# Patient Record
(demographics unavailable — no encounter records)

---

## 2024-11-25 NOTE — DISCUSSION/SUMMARY
[Patient] : the patient [Risks] : risks [Benefits] : benefits [Alternatives] : alternatives [___ Month(s)] : in [unfilled] month(s) [FreeTextEntry1] : Blood work reviewed questions addressed with patient renew rosuvastatin.  Follow-up with PMD, extremely annually and as needed.. Pulmonary referral made.    [EKG obtained to assist in diagnosis and management of assessed problem(s)] : EKG obtained to assist in diagnosis and management of assessed problem(s)

## 2024-11-25 NOTE — CARDIOLOGY SUMMARY
[de-identified] : March 27, 2023 sinus rhythm 10/2/23 sinus wnl November 25, 2024 sinus rhythm [de-identified] : 2021 mild aortic sclerosis normal LV function [de-identified] : 2022 coronary calcium score of 3 [de-identified] : 2022 carotid vascular study normal

## 2024-11-25 NOTE — REASON FOR VISIT
[CV Risk Factors and Non-Cardiac Disease] : CV risk factors and non-cardiac disease [Arrhythmia/ECG Abnorrmalities] : arrhythmia/ECG abnormalities [Hyperlipidemia] : hyperlipidemia [Hypertension] : hypertension [FreeTextEntry3] : nelson [FreeTextEntry1] : Taking statin" daily  without problem.  Remains active physically.  No chest pain dyspnea palpitations or edema.   She does have intermittent shortness of breath which she attributes to post-COVID.  Had recent labs with PMD reviewed and discussed.

## 2024-11-25 NOTE — ASSESSMENT
[FreeTextEntry1] : 71-year-old woman with presence of coronary calcium score of 3 on most recent testing hyperlipidemia elevated blood pressure  Asymptomatic.  Of note she indicates brother also has hyperlipidemia  LDL satisfactory.   Intermittent cough and dyspnea may be post-COVID unlikely to be cardiac

## 2024-11-25 NOTE — PHYSICAL EXAM

## 2025-01-15 NOTE — HISTORY OF PRESENT ILLNESS
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

## 2025-01-15 NOTE — PHYSICAL EXAM
[No Acute Distress] : no acute distress [No Deformities] : no deformities [IV] : Mallampati Class: IV [Normal Appearance] : normal appearance [Supple] : supple [No Neck Mass] : no neck mass [No JVD] : no jvd [Normal Rate/Rhythm] : normal rate/rhythm [Normal S1, S2] : normal s1, s2 [No Murmurs] : no murmurs [No Resp Distress] : no resp distress [No Acc Muscle Use] : no acc muscle use [Benign] : benign [Not Tender] : not tender [No Masses] : no masses [Soft] : soft [No Hernias] : no hernias [Normal Gait] : normal gait [No Clubbing] : no clubbing [No Edema] : no edema [No Rash] : no rash [No Motor Deficits] : no motor deficits [Normal Affect] : normal affect [TextBox_2] : pleasant    female   no cough  [TextBox_11] : crowded no gita puga  [TextBox_68] : bilateral air entry wheezing

## 2025-01-15 NOTE — REVIEW OF SYSTEMS
[Nasal Congestion] : nasal congestion [Wheezing] : wheezing [SOB on Exertion] : sob on exertion [Fever] : no fever [Recent Wt Gain (___ Lbs)] : ~T no recent weight gain [Chills] : no chills [Recent Wt Loss (___ Lbs)] : ~T no recent weight loss [Sore Throat] : no sore throat [Postnasal Drip] : no postnasal drip [Sinus Problems] : no sinus problems [Cough] : cough [Hemoptysis] : no hemoptysis [Sputum] : no sputum [Dyspnea] : no dyspnea [Chest Discomfort] : no chest discomfort [Palpitations] : no palpitations [GERD] : no gerd [Abdominal Pain] : no abdominal pain [Nausea] : no nausea [Vomiting] : no vomiting [Dysphagia] : no dysphagia [Bleeding] : no bleeding [Chronic Pain] : no chronic pain [Rash] : no rash [Blood Transfusion] : no blood transfusion [Clotting Disorder/ Frequent bleeding] : no clotting disorder/ frequent bleeding [Diabetes] : no diabetes [Thyroid Problem] : no thyroid problem [Obesity] : no obesity

## 2025-01-15 NOTE — ASSESSMENT
[FreeTextEntry1] : 72y/o female  1- poorly controlled allergic asthma    trigger  dust   cat  horse      2- allergies  3- nasal congestion  4- HTN  5- vaccination per primary   Recommendations 1- cxr   performed   normal  2- PFT 3- blood work  4- albuterol neb x one in office 5- nebulizer  shown today use and   care 6- duonebs  ordered   q   8 hour prn  7- nebulizer ordered 9- prevention  discussed Hepa  air filters and   home care dust control  10-  ENT  11- trial of  flonase one spray bid 12- azelastine one spray bid    -discussion meds  reviewed neb  and  imaging    -agreement on plan  - sleep evaluation once stable

## 2025-02-04 NOTE — SIGNATURES
[TextEntry] : This note was authored by Shahzad Ramirez working as a scribe for Dr. Joan Falcon.   I, Dr. Joan Falcon, have reviewed the content of this note and confirm it is true and accurate. I personally performed the history and physical examination and made all the decisions. 02/04/2025

## 2025-02-04 NOTE — HISTORY OF PRESENT ILLNESS
[FreeTextEntry1] : 2025. KATHARINE BARNES 73-year-old female  LMP . She presents for an annual gyn exam.  She denies abdominal and pelvic pain. No abnormal vaginal bleeding, vaginal discharge, or vaginitis symptoms. She reports normal urination, no dysuria, no incontinence of urine. BM is normal per patient, no blood in stool or constipation/diarrhea.  No new medical conditions, medications, or surgeries since last visit.  PHQ: 1.  OBHx:  GynHx: vaginal atrophy, h/o ovarian cysts. No Hx of STI, fibroids, abnl Paps, or pelvic infections. PMH: osteopenia, asthma, arrhythmia, basal cell carcinoma SHx: D&C FHx: father w/ colon ca, MGM w/ uterine ca. Denies FHx of breast or ovarian cancer. Med: Albuterol, Advair All: NKDA [TextBox_4] : Pelvic sono 11/21: normal [Mammogramdate] : 07/24 [BoneDensityDate] : 12/23 [TextBox_37] : osteopenia, hip frax of 3 [ColonoscopyDate] : 12/24

## 2025-02-04 NOTE — PHYSICAL EXAM
[Chaperone Declined] : Patient declined chaperone [Appropriately responsive] : appropriately responsive [Alert] : alert [No Acute Distress] : no acute distress [No Lymphadenopathy] : no lymphadenopathy [Regular Rate Rhythm] : regular rate rhythm [No Murmurs] : no murmurs [Clear to Auscultation B/L] : clear to auscultation bilaterally [Soft] : soft [Non-tender] : non-tender [Non-distended] : non-distended [No HSM] : No HSM [No Lesions] : no lesions [No Mass] : no mass [Oriented x3] : oriented x3 [Examination Of The Breasts] : a normal appearance [No Masses] : no breast masses were palpable [Vulvar Atrophy] : vulvar atrophy [Labia Majora] : normal [Labia Minora] : normal [Atrophy] : atrophy [Normal] : normal [Uterine Adnexae] : normal [FreeTextEntry6] : no change in lipoma over R collarbone 2 x 2cm [FreeTextEntry9] : Guaiac negative, no masses

## 2025-02-04 NOTE — PLAN
[FreeTextEntry1] : Routine Gyn Exam: GynHx: vaginal atrophy, h/o ovarian cysts, PMH: osteopenia, SHx: D&C, FHx: father w/ colon ca, MGM w/ uterine ca BSE taught. Pap smear conducted. Rx given for mammogram 7/25 (last 7/24)  Advised pt. to schedule colonoscopy 12/29 (last 12/24) Pelvic sonogram performed 11/21: wnl  Vaginal Atrophy: Advise OTC lubricants, coconut oil  Osteopenia, hip frax of 3: Advised pt to f/u w/ endocrinologist Advised Vit D3 daily, calcium in diet, weight-bearing exercises  RTO in 1 year or PRN.

## 2025-02-05 NOTE — REVIEW OF SYSTEMS
[Nasal Congestion] : nasal congestion [Cough] : cough [Wheezing] : wheezing [SOB on Exertion] : sob on exertion [Fever] : no fever [Recent Wt Gain (___ Lbs)] : ~T no recent weight gain [Chills] : no chills [Recent Wt Loss (___ Lbs)] : ~T no recent weight loss [Sore Throat] : no sore throat [Postnasal Drip] : no postnasal drip [Sinus Problems] : no sinus problems [Hemoptysis] : no hemoptysis [Sputum] : no sputum [Dyspnea] : no dyspnea [Chest Discomfort] : no chest discomfort [Palpitations] : no palpitations [GERD] : no gerd [Abdominal Pain] : no abdominal pain [Nausea] : no nausea [Vomiting] : no vomiting [Dysphagia] : no dysphagia [Bleeding] : no bleeding [Chronic Pain] : no chronic pain [Rash] : no rash [Blood Transfusion] : no blood transfusion [Clotting Disorder/ Frequent bleeding] : no clotting disorder/ frequent bleeding [Diabetes] : no diabetes [Thyroid Problem] : no thyroid problem [Obesity] : no obesity [TextBox_30] : mild improved

## 2025-02-05 NOTE — HISTORY OF PRESENT ILLNESS
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   noted  allergic asthma -dx  in her 20's allergic asthma  - covid  4/2021   sinus first  urgent care   fever    no hospitalization  -  - allergies  cat   dog   horses  - no pneumonia hx  - last  steroids  given    9/2024  -on flovent      only still some sob - was followed by allergist     did try allergy shots in past - currently  still with wheezing and cough   on  flovent  -overall improved  -     nasal spray  with  good results  less congestion -   2/5/2025 72y/o female    allergic asthma  now   s/p  FLU  - did not get vaccination  - also feels  slapped  cheek virus    - cough  - took tamiflu   then   prendisone  finished -  [TextBox_11] : 1/1 [TextBox_13] : 10

## 2025-02-05 NOTE — ASSESSMENT
[FreeTextEntry1] : 74y/o female  1-  recent FLU    with asthmatic bronchitis   improved allergic asthma     trigger  dust   cat  horse      2-   UACS 3- HTN  54 vaccination    FLu   +       Recommendations 1-    to get RSV virus  2- PFT 3-   s/p  Tamiflu     s/p prednisone  4-   advair bid rinse   after use     generic  5-azelastine    bid 6- DuoNeb's  ordered   q   8 hour prn  7- flonase bid   - return after PFT discussion meds    reviewed blood work

## 2025-02-05 NOTE — PHYSICAL EXAM
[III] : Mallampati Class: III [Normal Appearance] : normal appearance [Supple] : supple [No Neck Mass] : no neck mass [No JVD] : no jvd [Normal Rate/Rhythm] : normal rate/rhythm [Normal S1, S2] : normal s1, s2 [No Murmurs] : no murmurs [No Resp Distress] : no resp distress [No Acc Muscle Use] : no acc muscle use [Clear to Auscultation Bilaterally] : clear to auscultation bilaterally [Kyphosis] : kyphosis [Benign] : benign [Not Tender] : not tender [No Masses] : no masses [Soft] : soft [Normal Gait] : normal gait [No Rash] : no rash [No Motor Deficits] : no motor deficits [Normal Affect] : normal affect [TextBox_2] : pleasant f no distress  no   cough [TextBox_11] : no lesion  moist no thrush

## 2025-02-05 NOTE — ASSESSMENT
[FreeTextEntry1] : 72y/o female  1-  recent FLU    with asthmatic bronchitis   improved allergic asthma     trigger  dust   cat  horse      2-   UACS 3- HTN  54 vaccination    FLu   +       Recommendations 1-    to get RSV virus  2- PFT 3-   s/p  Tamiflu     s/p prednisone  4-   advair bid rinse   after use     generic  5-azelastine    bid 6- DuoNeb's  ordered   q   8 hour prn  7- flonase bid   - return after PFT discussion meds    reviewed blood work

## 2025-02-05 NOTE — HISTORY OF PRESENT ILLNESS
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   noted  allergic asthma -dx  in her 20's allergic asthma  - covid  4/2021   sinus first  urgent care   fever    no hospitalization  -  - allergies  cat   dog   horses  - no pneumonia hx  - last  steroids  given    9/2024  -on flovent      only still some sob - was followed by allergist     did try allergy shots in past - currently  still with wheezing and cough   on  flovent  -overall improved  -     nasal spray  with  good results  less congestion -   2/5/2025 74y/o female    allergic asthma  now   s/p  FLU  - did not get vaccination  - also feels  slapped  cheek virus    - cough  - took tamiflu   then   prendisone  finished -  [TextBox_11] : 1/1 [TextBox_13] : 10 Detail Level: Zone Never

## 2025-04-16 NOTE — HISTORY OF PRESENT ILLNESS
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   noted  allergic asthma -dx  in her 20's allergic asthma  - covid  4/2021   sinus first  urgent care   fever    no hospitalization  -  - allergies  cat   dog   horses  - no pneumonia hx  - last  steroids  given    9/2024  -on flovent      only still some sob - was followed by allergist     did try allergy shots in past - currently  still with wheezing and cough   on  flovent  -overall improved  -     nasal spray  with  good results  less congestion -   2/5/2025 74y/o female    allergic asthma  now   s/p  FLU  - did not get vaccination  - also feels  slapped  cheek virus    - cough  - took tamiflu   then   prendisone  finished -   4/16/2025 74y/o female allergic asthma    f/u - advair   doign well  no cough    - nasal congestion   feels  allergies  -reviewed PFT  9.6 %   BD response - reviewed blood work  eos > 200    allergies  reviewed -   [TextBox_11] : 1/1 [TextBox_13] : 10

## 2025-04-16 NOTE — PHYSICAL EXAM
[IV] : Mallampati Class: IV [Normal Appearance] : normal appearance [Supple] : supple [No JVD] : no jvd [Normal Rate/Rhythm] : normal rate/rhythm [Normal S1, S2] : normal s1, s2 [No Murmurs] : no murmurs [No Resp Distress] : no resp distress [No Acc Muscle Use] : no acc muscle use [Clear to Auscultation Bilaterally] : clear to auscultation bilaterally [Benign] : benign [Not Tender] : not tender [No Masses] : no masses [Soft] : soft [No Hernias] : no hernias [Normal Bowel Sounds] : normal bowel sounds [Normal Gait] : normal gait [No Clubbing] : no clubbing [No Edema] : no edema [No Rash] : no rash [No Motor Deficits] : no motor deficits [Normal Affect] : normal affect [TextBox_2] : pleasant f no distress no cough  [TextBox_11] : crowded no lesion moist

## 2025-04-16 NOTE — PROCEDURE
[FreeTextEntry1] : 4/16/2025  Good efforts spirometry suggest  normal  FVC   reduced   FEV1  65%   reduced  FEV1/FVC  reduced  FEF 25-75%  lung volumes by BB reveal  normal TLC  DLCO and DLCO/va are normal  There is   a  9.6 %  increase in post bd   FVC  Impression  Mild-mod obstructive airflow pattern witj  BD response in FVC normal TLC and DLCO    1/15/2025 albuterol neb  x one  teaching neb use today

## 2025-04-16 NOTE — ASSESSMENT
[FreeTextEntry1] : 72y/o female  1-   eosinophilic allergic asthma   trigger  dust   cat  horse     mold    2-   UACS 3- HTN  4-  vaccination    FLu   +       Recommendations 1-    ENT input    2-   ct  chest      3-    allergiest  4-   advair bid rinse   after use     generic  5-   azelastine  with  some nasal issues    d/c   saline nasal spray  6-  DuoNeb's  ordered   q   8 hour prn   -discussiono reviewed   PFT blood work and medications including    nucala

## 2025-06-10 NOTE — REASON FOR VISIT
[Initial Consultation] : an initial consultation for [FreeTextEntry2] : congestion, postnasal drip, raspy voice

## 2025-06-10 NOTE — CONSULT LETTER
[Dear  ___] : Dear  [unfilled], [Courtesy Letter:] : I had the pleasure of seeing your patient, [unfilled], in my office today. [Please see my note below.] : Please see my note below. [Sincerely,] : Sincerely, [FreeTextEntry2] : Heidi Samano MD .nww  [FreeTextEntry3] : Purnima Ortiz, NELY, PAAnshuC Sr. Physician Assistant, Otolaryngology at [facility] Adjunct Clinical Instructor, Department of Physician Assistant Studies, [facility] [address] [address]  [DrDylan  ___] : Dr. COUCH

## 2025-06-10 NOTE — HISTORY OF PRESENT ILLNESS
[de-identified] : Ms. BARNES is a 73-year-old female who presents with congestion, raspy voice, and postnasal drip.  She has a history of having allergies and reports that since September of last year, she has been having general congestion in her chest and throat, which has not resolved.  She states that there are days her voice is raspy than others.  She is also a former smoker.  She has seen an allergist many years ago with minimal results.  She is currently under the care of her pulmonologist, Dr. Samano, for asthma.  She says that there are times her postnasal drip is heavy.  She is on Azelastine and Flonase, but not on a regular basis, since she has had episodes of mild epistaxis. [Clear Rhinorrhea] : clear rhinorrhea

## 2025-06-10 NOTE — ASSESSMENT
[FreeTextEntry1] : -  Neilmed Nasal rinse daily -  Continue Flonase, two spray each nostril daily -  Prednisone taper -  Consider Allergy referal -  Will follow up with patient in 3 weeks and if still present, will consider referring to Dr. Martinez for further management.

## 2025-06-10 NOTE — PHYSICAL EXAM
[de-identified] : +polyp in middle tubinate in right nostril [Midline] : trachea located in midline position [Laryngoscopy Performed] : laryngoscopy was performed, see procedure section for findings [Normal] : no rashes

## 2025-06-10 NOTE — PROCEDURE
[Unable to Cooperate with Mirror] : patient unable to cooperate with mirror [Hoarseness] : hoarseness not clearly evaluated by indirect laryngoscopy [Topical Lidocaine] : topical lidocaine [Oxymetazoline HCl] : oxymetazoline HCl [Flexible Endoscope] : examined with the flexible endoscope [Serial Number: ___] : Serial Number: [unfilled] [Normal] : posterior cricoid area had healthy pink mucosa in the interarytenoid area and the esophageal inlet [FreeTextEntry3] : +polyp in right middle turbinate.

## 2025-06-10 NOTE — REVIEW OF SYSTEMS
Ferritin, Iron TIBC labs reviewed by Dr. Powell who advises labs are good and to please let the patient know.  Writer attempted to contact patient and make her aware. Message left for patient on voicemail.    [Seasonal Allergies] : seasonal allergies [Post Nasal Drip] : post nasal drip [Nasal Congestion] : no nasal congestion [Nose Bleeds] : nose bleeds [Sinus Pressure] : sinus pressure [Problem Snoring] : problem snoring [Discolored Nasal Discharge] : no discolored nasal discharge [As Noted in HPI] : as noted in HPI [Hoarseness] : hoarseness [Throat Clearing] : throat clearing [Negative] : Heme/Lymph

## 2025-06-27 NOTE — REVIEW OF SYSTEMS
[Wheezing] : wheezing [SOB on Exertion] : sob on exertion [Fever] : no fever [Recent Wt Gain (___ Lbs)] : ~T no recent weight gain [Chills] : no chills [Recent Wt Loss (___ Lbs)] : ~T no recent weight loss [Sore Throat] : no sore throat [Nasal Congestion] : no nasal congestion [Postnasal Drip] : no postnasal drip [Sinus Problems] : no sinus problems [Cough] : no cough [Hemoptysis] : no hemoptysis [Sputum] : no sputum [Dyspnea] : no dyspnea [Chest Discomfort] : no chest discomfort [Palpitations] : no palpitations [GERD] : no gerd [Abdominal Pain] : no abdominal pain [Nausea] : no nausea [Vomiting] : no vomiting [Dysphagia] : no dysphagia [Bleeding] : no bleeding [Chronic Pain] : no chronic pain [Rash] : no rash [Blood Transfusion] : no blood transfusion [Clotting Disorder/ Frequent bleeding] : no clotting disorder/ frequent bleeding [Diabetes] : no diabetes [Thyroid Problem] : no thyroid problem [Obesity] : no obesity [TextBox_14] : now    improved  [TextBox_30] :  pauline

## 2025-06-27 NOTE — ASSESSMENT
[FreeTextEntry1] : 74y/o female  1-   eosinophilic ( eos 290 )     allergic asthma        trigger  dust   cat  horse     mold    2-  nasal polyp  with   UACS 3- HTN  4-  vaccination    FLu   +       Recommendations 1-     improved overall   since rinses   2-    discussed nucala  if  needed in future   3-     ENT f/u    4-   advair bid rinse   after use     generic  5-   azelastine  with  some nasal issues    d/c   saline nasal spray  6-  DuoNeb's  ordered   q   8 hour prn   -discussion reviewed   PFT blood work and medications including    nucala

## 2025-06-27 NOTE — PHYSICAL EXAM
[No Acute Distress] : no acute distress [No Deformities] : no deformities [IV] : Mallampati Class: IV [Supple] : supple [Normal Rate/Rhythm] : normal rate/rhythm [Normal S1, S2] : normal s1, s2 [No Murmurs] : no murmurs [No Resp Distress] : no resp distress [No Acc Muscle Use] : no acc muscle use [Clear to Auscultation Bilaterally] : clear to auscultation bilaterally [Kyphosis] : kyphosis [Benign] : benign [Not Tender] : not tender [No Masses] : no masses [Soft] : soft [No Hernias] : no hernias [Normal Bowel Sounds] : normal bowel sounds [Normal Gait] : normal gait [No Clubbing] : no clubbing [No Edema] : no edema [No Rash] : no rash [No Motor Deficits] : no motor deficits [Normal Affect] : normal affect [TextBox_2] : pleasant f no distress no cough no sob [TextBox_11] : no  lesion moist

## 2025-06-27 NOTE — HISTORY OF PRESENT ILLNESS
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   noted  allergic asthma -dx  in her 20's allergic asthma  - covid  4/2021   sinus first  urgent care   fever    no hospitalization  -  - allergies  cat   dog   horses  - no pneumonia hx  - last  steroids  given    9/2024  -on flovent      only still some sob - was followed by allergist     did try allergy shots in past - currently  still with wheezing and cough   on  flovent  -overall improved  -     nasal spray  with  good results  less congestion -   2/5/2025 72y/o female    allergic asthma  now   s/p  FLU  - did not get vaccination  - also feels  slapped  cheek virus    - cough  - took tamiflu   then   prendisone  finished -   4/16/2025 72y/o female allergic asthma    f/u - advair   doign well  no cough    - nasal congestion   feels  allergies  -reviewed PFT  9.6 %   BD response - reviewed blood work  eos > 200    allergies  reviewed -  6/24/2025 72y/o female allergic  asthma      now nasal polyp    f/u - advair  - flonase now - feels  good  - since new nasal rinses  feels much better no issues - no wheezing -  [TextBox_11] : 1/1 [TextBox_13] : 10